# Patient Record
Sex: FEMALE | Race: WHITE | Employment: FULL TIME | URBAN - METROPOLITAN AREA
[De-identification: names, ages, dates, MRNs, and addresses within clinical notes are randomized per-mention and may not be internally consistent; named-entity substitution may affect disease eponyms.]

---

## 2023-05-05 ENCOUNTER — HOSPITAL ENCOUNTER (EMERGENCY)
Age: 48
Discharge: HOME | End: 2023-05-05
Attending: EMERGENCY MEDICINE
Payer: COMMERCIAL

## 2023-05-05 ENCOUNTER — HOSPITAL ENCOUNTER (OUTPATIENT)
Age: 48
Setting detail: SPECIMEN
Discharge: HOME | End: 2023-05-05
Payer: COMMERCIAL

## 2023-05-05 ENCOUNTER — HOSPITAL ENCOUNTER (OUTPATIENT)
Dept: CT IMAGING | Age: 48
End: 2023-05-05
Payer: COMMERCIAL

## 2023-05-05 VITALS
BODY MASS INDEX: 35.31 KG/M2 | DIASTOLIC BLOOD PRESSURE: 88 MMHG | OXYGEN SATURATION: 97 % | HEART RATE: 80 BPM | HEIGHT: 67 IN | TEMPERATURE: 98.4 F | WEIGHT: 225 LBS | SYSTOLIC BLOOD PRESSURE: 138 MMHG | RESPIRATION RATE: 18 BRPM

## 2023-05-05 DIAGNOSIS — R10.9 ABDOMINAL PAIN, UNSPECIFIED ABDOMINAL LOCATION: ICD-10-CM

## 2023-05-05 DIAGNOSIS — K52.9 COLITIS: Primary | ICD-10-CM

## 2023-05-05 LAB
ABSOLUTE EOS #: 0.18 K/UL (ref 0–0.44)
ABSOLUTE EOS #: 0.2 K/UL (ref 0–0.4)
ABSOLUTE IMMATURE GRANULOCYTE: 0.06 K/UL (ref 0–0.3)
ABSOLUTE LYMPH #: 1.77 K/UL (ref 1.1–3.7)
ABSOLUTE LYMPH #: 1.9 K/UL (ref 1–4.8)
ABSOLUTE MONO #: 0.55 K/UL (ref 0.1–1.2)
ABSOLUTE MONO #: 0.6 K/UL (ref 0.1–1.2)
ALBUMIN SERPL-MCNC: 4.1 G/DL (ref 3.5–5.2)
ALBUMIN/GLOBULIN RATIO: 1.5 (ref 1–2.5)
ALP SERPL-CCNC: 44 U/L (ref 35–104)
ALT SERPL-CCNC: 17 U/L (ref 5–33)
ANION GAP SERPL CALCULATED.3IONS-SCNC: 12 MMOL/L (ref 9–17)
AST SERPL-CCNC: 14 U/L
BASOPHILS # BLD: 1 % (ref 0–2)
BASOPHILS # BLD: 1 % (ref 0–2)
BASOPHILS ABSOLUTE: 0 K/UL (ref 0–0.2)
BASOPHILS ABSOLUTE: 0.06 K/UL (ref 0–0.2)
BILIRUB SERPL-MCNC: 0.8 MG/DL (ref 0.3–1.2)
BUN SERPL-MCNC: 15 MG/DL (ref 6–20)
CALCIUM SERPL-MCNC: 9.4 MG/DL (ref 8.6–10.4)
CHLORIDE SERPL-SCNC: 102 MMOL/L (ref 98–107)
CO2 SERPL-SCNC: 22 MMOL/L (ref 20–31)
CREAT SERPL-MCNC: 0.57 MG/DL (ref 0.5–0.9)
EOSINOPHILS RELATIVE PERCENT: 2 % (ref 1–4)
EOSINOPHILS RELATIVE PERCENT: 3 % (ref 1–4)
ERYTHROCYTE [SEDIMENTATION RATE] IN BLOOD BY WESTERGREN METHOD: 15 MM/HR (ref 0–20)
GFR SERPL CREATININE-BSD FRML MDRD: >60 ML/MIN/1.73M2
GLUCOSE SERPL-MCNC: 91 MG/DL (ref 70–99)
HCT VFR BLD AUTO: 42 % (ref 36–46)
HCT VFR BLD AUTO: 47.1 % (ref 36.3–47.1)
HGB BLD-MCNC: 14.1 G/DL (ref 12–16)
HGB BLD-MCNC: 15.3 G/DL (ref 11.9–15.1)
IMMATURE GRANULOCYTES: 1 %
LACTATE PLASV-SCNC: 1.4 MMOL/L (ref 0.5–2.2)
LIPASE SERPL-CCNC: 47 U/L (ref 13–60)
LYMPHOCYTES # BLD: 22 % (ref 24–43)
LYMPHOCYTES # BLD: 23 % (ref 24–44)
MCH RBC QN AUTO: 30.4 PG (ref 25.2–33.5)
MCH RBC QN AUTO: 30.5 PG (ref 26–34)
MCHC RBC AUTO-ENTMCNC: 32.5 G/DL (ref 28.4–34.8)
MCHC RBC AUTO-ENTMCNC: 33.5 G/DL (ref 31–37)
MCV RBC AUTO: 91.2 FL (ref 80–100)
MCV RBC AUTO: 93.5 FL (ref 82.6–102.9)
MONOCYTES # BLD: 7 % (ref 2–11)
MONOCYTES # BLD: 7 % (ref 3–12)
NRBC AUTOMATED: 0 PER 100 WBC
PDW BLD-RTO: 12.8 % (ref 11.8–14.4)
PDW BLD-RTO: 13.4 % (ref 12.5–15.4)
PLATELET # BLD AUTO: 226 K/UL (ref 140–450)
PLATELET # BLD AUTO: 257 K/UL (ref 138–453)
PMV BLD AUTO: 6.8 FL (ref 6–12)
PMV BLD AUTO: 9.5 FL (ref 8.1–13.5)
POTASSIUM SERPL-SCNC: 4.1 MMOL/L (ref 3.7–5.3)
PROT SERPL-MCNC: 6.8 G/DL (ref 6.4–8.3)
RBC # BLD: 4.61 M/UL (ref 4–5.2)
RBC # BLD: 5.04 M/UL (ref 3.95–5.11)
SEG NEUTROPHILS: 66 % (ref 36–66)
SEG NEUTROPHILS: 67 % (ref 36–65)
SEGMENTED NEUTROPHILS ABSOLUTE COUNT: 5.37 K/UL (ref 1.5–8.1)
SEGMENTED NEUTROPHILS ABSOLUTE COUNT: 5.5 K/UL (ref 1.8–7.7)
SODIUM SERPL-SCNC: 136 MMOL/L (ref 135–144)
WBC # BLD AUTO: 8 K/UL (ref 3.5–11.3)
WBC # BLD AUTO: 8.3 K/UL (ref 3.5–11)

## 2023-05-05 PROCEDURE — 85025 COMPLETE CBC W/AUTO DIFF WBC: CPT

## 2023-05-05 PROCEDURE — 83690 ASSAY OF LIPASE: CPT

## 2023-05-05 PROCEDURE — 83605 ASSAY OF LACTIC ACID: CPT

## 2023-05-05 PROCEDURE — 6360000004 HC RX CONTRAST MEDICATION: Performed by: NURSE PRACTITIONER

## 2023-05-05 PROCEDURE — 74177 CT ABD & PELVIS W/CONTRAST: CPT

## 2023-05-05 PROCEDURE — 36415 COLL VENOUS BLD VENIPUNCTURE: CPT

## 2023-05-05 PROCEDURE — 80053 COMPREHEN METABOLIC PANEL: CPT

## 2023-05-05 PROCEDURE — 99283 EMERGENCY DEPT VISIT LOW MDM: CPT

## 2023-05-05 PROCEDURE — 2580000003 HC RX 258: Performed by: NURSE PRACTITIONER

## 2023-05-05 PROCEDURE — 6370000000 HC RX 637 (ALT 250 FOR IP): Performed by: NURSE PRACTITIONER

## 2023-05-05 RX ORDER — CIPROFLOXACIN 500 MG/1
500 TABLET, FILM COATED ORAL 2 TIMES DAILY
Qty: 20 TABLET | Refills: 0 | Status: SHIPPED | OUTPATIENT
Start: 2023-05-05 | End: 2023-05-15

## 2023-05-05 RX ORDER — CIPROFLOXACIN 250 MG/1
500 TABLET, FILM COATED ORAL ONCE
Status: COMPLETED | OUTPATIENT
Start: 2023-05-05 | End: 2023-05-05

## 2023-05-05 RX ORDER — IXEKIZUMAB 80 MG/ML
INJECTION, SOLUTION SUBCUTANEOUS
COMMUNITY

## 2023-05-05 RX ORDER — METRONIDAZOLE 500 MG/1
500 TABLET ORAL 2 TIMES DAILY
Qty: 20 TABLET | Refills: 0 | Status: SHIPPED | OUTPATIENT
Start: 2023-05-05 | End: 2023-05-15

## 2023-05-05 RX ORDER — NITROFURANTOIN 25; 75 MG/1; MG/1
CAPSULE ORAL
COMMUNITY
Start: 2022-10-12

## 2023-05-05 RX ORDER — METRONIDAZOLE 500 MG/1
500 TABLET ORAL ONCE
Status: COMPLETED | OUTPATIENT
Start: 2023-05-05 | End: 2023-05-05

## 2023-05-05 RX ORDER — PROPRANOLOL HCL 60 MG
CAPSULE, EXTENDED RELEASE 24HR ORAL
COMMUNITY

## 2023-05-05 RX ORDER — SODIUM CHLORIDE 0.9 % (FLUSH) 0.9 %
10 SYRINGE (ML) INJECTION PRN
Status: DISCONTINUED | OUTPATIENT
Start: 2023-05-05 | End: 2023-05-08 | Stop reason: HOSPADM

## 2023-05-05 RX ORDER — 0.9 % SODIUM CHLORIDE 0.9 %
80 INTRAVENOUS SOLUTION INTRAVENOUS ONCE
Status: COMPLETED | OUTPATIENT
Start: 2023-05-05 | End: 2023-05-05

## 2023-05-05 RX ORDER — DULOXETIN HYDROCHLORIDE 60 MG/1
1 CAPSULE, DELAYED RELEASE ORAL
COMMUNITY
Start: 2019-05-29

## 2023-05-05 RX ADMIN — METRONIDAZOLE 500 MG: 500 TABLET ORAL at 20:07

## 2023-05-05 RX ADMIN — IOPAMIDOL 75 ML: 755 INJECTION, SOLUTION INTRAVENOUS at 13:17

## 2023-05-05 RX ADMIN — SODIUM CHLORIDE 80 ML: 9 INJECTION, SOLUTION INTRAVENOUS at 13:17

## 2023-05-05 RX ADMIN — SODIUM CHLORIDE, PRESERVATIVE FREE 10 ML: 5 INJECTION INTRAVENOUS at 13:17

## 2023-05-05 RX ADMIN — CIPROFLOXACIN 500 MG: 250 TABLET, FILM COATED ORAL at 20:07

## 2023-05-05 ASSESSMENT — PAIN - FUNCTIONAL ASSESSMENT: PAIN_FUNCTIONAL_ASSESSMENT: 0-10

## 2023-05-05 ASSESSMENT — PAIN SCALES - GENERAL: PAINLEVEL_OUTOF10: 6

## 2023-05-05 NOTE — DISCHARGE INSTRUCTIONS
worsening, changing or persistent symptoms please call your family doctor or return to the ER immediately. Tell us how we did during your visit at [email or web address]   and let us know about your experience

## 2023-05-05 NOTE — ED PROVIDER NOTES
[facility] Emergency Department  [id number] [address] [facility] [address] [facility] [address]  Phone: [phone]  Fax: [phone]      Attending Physician [address]       Chief Complaint   Patient presents with    Abdominal Pain     LUQ pain; had CT scan earlier today and appeared to be colitis; PCP advised to go to ER for further evaluation       DIAGNOSTIC RESULTS     LABS:  Labs Reviewed   CBC WITH AUTO DIFFERENTIAL   COMPREHENSIVE METABOLIC PANEL   LIPASE   LACTIC ACID       All other labs were within normal range or not returned as of this dictation. RADIOLOGY:  No orders to display         EMERGENCY DEPARTMENT COURSE:   Vitals:    Vitals:    05/05/23 1745   BP: 138/88   Pulse: 80   Resp: 18   Temp: 98.4 °F (36.9 °C)   TempSrc: Oral   SpO2: 97%   Weight: 102.1 kg (225 lb)   Height: 5' 7\" (1.702 m)     -------------------------  BP: 138/88, Temp: 98.4 °F (36.9 °C), Pulse: 80, Respirations: 18             PERTINENT ATTENDING PHYSICIAN COMMENTS:    I performed a history and physical examination of the patient and discussed management with the mid level provider. I reviewed the mid level provider's note and agree with the documented findings and plan of care. Any areas of disagreement are noted on the chart. I was personally present for the key portions of any procedures. I have documented in the chart those procedures where I was not present during the key portions. I have reviewed the emergency nurses triage note. I agree with the chief complaint, past medical history, past surgical history, allergies, medications, social and family history as documented unless otherwise noted below. Documentation of the HPI, Physical Exam and Medical Decision Making performed by mid level providers is based on my personal performance of the HPI, PE and MDM.  For Physician Assistant/ Nurse Practitioner cases/documentation I have personally evaluated this patient and have completed at least
in 8-12 hr if not improved for re-evaluation. We also discussed returning to the Emergency Department immediately if new or worsening symptoms occur. We have discussed the symptoms which are most concerning (e.g., bloody stool, fever, changing or worsening pain, persistent vomiting, chest pain shortness of breath, numbness or weakness to the arms or legs, coolness or color change to the arms or legs) that necessitate immediate return. The patient understands that at this time there is no evidence for a more malignant underlying process, but the patient also understands that early in the process of an illness or injury, an emergency department workup can be falsely reassuring. Routine discharge counseling was given, and the patient understands that worsening, changing or persistent symptoms should prompt an immediate call or follow up with their primary physician or return to the emergency department. The importance of appropriate follow up was also discussed. I have reviewed the disposition diagnosis with the patient and or their family/guardian. I have answered their questions and given discharge instructions. They voiced understanding of these instructions and did not have any further questions or complaints. REASSESSMENT          CRITICAL CARE TIME     CONSULTS:  None    PROCEDURES:  Unless otherwise noted below, none     Procedures        FINAL IMPRESSION      1. Colitis          DISPOSITION/PLAN   DISPOSITION Decision To Discharge 05/05/2023 07:56:34 PM      PATIENT REFERRED TO:  Jeremías Magana MD  [facility] [id number]  [address] [place] [address] Emergency Department  [address] [place] [address] [phone]    If symptoms worsen      DISCHARGE MEDICATIONS:  Discharge Medication List as of 5/5/2023  7:59 PM        START taking these medications    Details   ciprofloxacin (CIPRO) 500 MG tablet Take 1 tablet by mouth 2 times daily for 10

## 2023-05-06 LAB
25(OH)D3 SERPL-MCNC: 20 NG/ML
ALBUMIN SERPL-MCNC: 4.4 G/DL (ref 3.5–5.2)
ALBUMIN/GLOBULIN RATIO: 1.5 (ref 1–2.5)
ALP SERPL-CCNC: 47 U/L (ref 35–104)
ALT SERPL-CCNC: 19 U/L (ref 5–33)
ANION GAP SERPL CALCULATED.3IONS-SCNC: 12 MMOL/L (ref 9–17)
AST SERPL-CCNC: 15 U/L
BILIRUB SERPL-MCNC: 1 MG/DL (ref 0.3–1.2)
BUN SERPL-MCNC: 12 MG/DL (ref 6–20)
CALCIUM SERPL-MCNC: 9.4 MG/DL (ref 8.6–10.4)
CHLORIDE SERPL-SCNC: 101 MMOL/L (ref 98–107)
CO2 SERPL-SCNC: 24 MMOL/L (ref 20–31)
CREAT SERPL-MCNC: 0.56 MG/DL (ref 0.5–0.9)
CRP SERPL HS-MCNC: 5.2 MG/L (ref 0–5)
GFR SERPL CREATININE-BSD FRML MDRD: >60 ML/MIN/1.73M2
GLUCOSE SERPL-MCNC: 90 MG/DL (ref 70–99)
MAGNESIUM SERPL-MCNC: 2.2 MG/DL (ref 1.6–2.6)
POTASSIUM SERPL-SCNC: 4.5 MMOL/L (ref 3.7–5.3)
PROT SERPL-MCNC: 7.4 G/DL (ref 6.4–8.3)
SODIUM SERPL-SCNC: 137 MMOL/L (ref 135–144)
T4 FREE SERPL-MCNC: 1.2 NG/DL (ref 0.9–1.7)
TSH SERPL-ACNC: 0.98 UIU/ML (ref 0.3–5)

## 2023-05-06 ASSESSMENT — ENCOUNTER SYMPTOMS: ABDOMINAL PAIN: 1

## 2023-05-07 LAB
EST. AVERAGE GLUCOSE BLD GHB EST-MCNC: 108 MG/DL
HBA1C MFR BLD: 5.4 % (ref 4–6)

## 2023-05-10 ENCOUNTER — APPOINTMENT (OUTPATIENT)
Dept: CT IMAGING | Age: 48
End: 2023-05-10
Payer: COMMERCIAL

## 2023-05-10 ENCOUNTER — HOSPITAL ENCOUNTER (EMERGENCY)
Age: 48
Discharge: HOME | End: 2023-05-10
Attending: EMERGENCY MEDICINE
Payer: COMMERCIAL

## 2023-05-10 VITALS
HEIGHT: 67 IN | TEMPERATURE: 98.4 F | DIASTOLIC BLOOD PRESSURE: 88 MMHG | BODY MASS INDEX: 34.53 KG/M2 | RESPIRATION RATE: 16 BRPM | OXYGEN SATURATION: 99 % | SYSTOLIC BLOOD PRESSURE: 148 MMHG | HEART RATE: 71 BPM | WEIGHT: 220 LBS

## 2023-05-10 DIAGNOSIS — R10.32 LEFT LOWER QUADRANT ABDOMINAL PAIN: Primary | ICD-10-CM

## 2023-05-10 DIAGNOSIS — K63.89 EPIPLOIC APPENDAGITIS: ICD-10-CM

## 2023-05-10 LAB
ABSOLUTE EOS #: 0.2 K/UL (ref 0–0.4)
ABSOLUTE LYMPH #: 1.6 K/UL (ref 1–4.8)
ABSOLUTE MONO #: 0.5 K/UL (ref 0.1–1.2)
ANION GAP SERPL CALCULATED.3IONS-SCNC: 13 MMOL/L (ref 9–17)
BACTERIA: ABNORMAL
BASOPHILS # BLD: 1 % (ref 0–2)
BASOPHILS ABSOLUTE: 0.1 K/UL (ref 0–0.2)
BILIRUBIN URINE: NEGATIVE
BUN SERPL-MCNC: 11 MG/DL (ref 6–20)
CALCIUM SERPL-MCNC: 9.6 MG/DL (ref 8.6–10.4)
CHLORIDE SERPL-SCNC: 104 MMOL/L (ref 98–107)
CO2 SERPL-SCNC: 23 MMOL/L (ref 20–31)
COLOR: YELLOW
CREAT SERPL-MCNC: 0.68 MG/DL (ref 0.5–0.9)
EOSINOPHILS RELATIVE PERCENT: 2 % (ref 1–4)
EPITHELIAL CELLS UA: ABNORMAL /HPF (ref 0–5)
GFR SERPL CREATININE-BSD FRML MDRD: >60 ML/MIN/1.73M2
GLUCOSE SERPL-MCNC: 97 MG/DL (ref 70–99)
GLUCOSE UR STRIP.AUTO-MCNC: NEGATIVE MG/DL
HCT VFR BLD AUTO: 44.9 % (ref 36–46)
HGB BLD-MCNC: 15 G/DL (ref 12–16)
KETONES UR STRIP.AUTO-MCNC: NEGATIVE MG/DL
LACTATE PLASV-SCNC: 1.6 MMOL/L (ref 0.5–2.2)
LEUKOCYTE ESTERASE UR QL STRIP.AUTO: ABNORMAL
LYMPHOCYTES # BLD: 22 % (ref 24–44)
MCH RBC QN AUTO: 30 PG (ref 26–34)
MCHC RBC AUTO-ENTMCNC: 33.5 G/DL (ref 31–37)
MCV RBC AUTO: 89.7 FL (ref 80–100)
MONOCYTES # BLD: 7 % (ref 2–11)
NITRITE UR QL STRIP.AUTO: NEGATIVE
OTHER OBSERVATIONS UA: ABNORMAL
PDW BLD-RTO: 13.2 % (ref 12.5–15.4)
PLATELET # BLD AUTO: 295 K/UL (ref 140–450)
PMV BLD AUTO: 6.8 FL (ref 6–12)
POTASSIUM SERPL-SCNC: 3.9 MMOL/L (ref 3.7–5.3)
PROT UR STRIP.AUTO-MCNC: 5 MG/DL (ref 5–8)
PROT UR STRIP.AUTO-MCNC: NEGATIVE MG/DL
RBC # BLD: 5.01 M/UL (ref 4–5.2)
RBC CLUMPS #/AREA URNS AUTO: ABNORMAL /HPF (ref 0–2)
SEG NEUTROPHILS: 68 % (ref 36–66)
SEGMENTED NEUTROPHILS ABSOLUTE COUNT: 4.9 K/UL (ref 1.8–7.7)
SODIUM SERPL-SCNC: 140 MMOL/L (ref 135–144)
SPECIFIC GRAVITY UA: 1.01 (ref 1–1.03)
TURBIDITY: CLEAR
URINE HGB: NEGATIVE
UROBILINOGEN, URINE: NORMAL
WBC # BLD AUTO: 7.1 K/UL (ref 3.5–11)
WBC UA: ABNORMAL /HPF (ref 0–5)

## 2023-05-10 PROCEDURE — 80048 BASIC METABOLIC PNL TOTAL CA: CPT

## 2023-05-10 PROCEDURE — 99285 EMERGENCY DEPT VISIT HI MDM: CPT

## 2023-05-10 PROCEDURE — 96374 THER/PROPH/DIAG INJ IV PUSH: CPT

## 2023-05-10 PROCEDURE — 96372 THER/PROPH/DIAG INJ SC/IM: CPT

## 2023-05-10 PROCEDURE — 81001 URINALYSIS AUTO W/SCOPE: CPT

## 2023-05-10 PROCEDURE — 36415 COLL VENOUS BLD VENIPUNCTURE: CPT

## 2023-05-10 PROCEDURE — 83605 ASSAY OF LACTIC ACID: CPT

## 2023-05-10 PROCEDURE — 85025 COMPLETE CBC W/AUTO DIFF WBC: CPT

## 2023-05-10 PROCEDURE — 74177 CT ABD & PELVIS W/CONTRAST: CPT

## 2023-05-10 PROCEDURE — 2580000003 HC RX 258: Performed by: NURSE PRACTITIONER

## 2023-05-10 PROCEDURE — 6360000002 HC RX W HCPCS: Performed by: NURSE PRACTITIONER

## 2023-05-10 PROCEDURE — 6360000004 HC RX CONTRAST MEDICATION: Performed by: NURSE PRACTITIONER

## 2023-05-10 RX ORDER — 0.9 % SODIUM CHLORIDE 0.9 %
80 INTRAVENOUS SOLUTION INTRAVENOUS ONCE
Status: DISCONTINUED | OUTPATIENT
Start: 2023-05-10 | End: 2023-05-10 | Stop reason: HOSPADM

## 2023-05-10 RX ORDER — ONDANSETRON 2 MG/ML
4 INJECTION INTRAMUSCULAR; INTRAVENOUS ONCE
Status: COMPLETED | OUTPATIENT
Start: 2023-05-10 | End: 2023-05-10

## 2023-05-10 RX ORDER — 0.9 % SODIUM CHLORIDE 0.9 %
1000 INTRAVENOUS SOLUTION INTRAVENOUS ONCE
Status: COMPLETED | OUTPATIENT
Start: 2023-05-10 | End: 2023-05-10

## 2023-05-10 RX ORDER — DICYCLOMINE HYDROCHLORIDE 10 MG/ML
20 INJECTION INTRAMUSCULAR ONCE
Status: COMPLETED | OUTPATIENT
Start: 2023-05-10 | End: 2023-05-10

## 2023-05-10 RX ORDER — SODIUM CHLORIDE 0.9 % (FLUSH) 0.9 %
10 SYRINGE (ML) INJECTION PRN
Status: DISCONTINUED | OUTPATIENT
Start: 2023-05-10 | End: 2023-05-10 | Stop reason: HOSPADM

## 2023-05-10 RX ORDER — HYDROCODONE BITARTRATE AND ACETAMINOPHEN 5; 325 MG/1; MG/1
1 TABLET ORAL EVERY 6 HOURS PRN
Qty: 12 TABLET | Refills: 0 | Status: SHIPPED | OUTPATIENT
Start: 2023-05-10 | End: 2023-05-13

## 2023-05-10 RX ORDER — DICYCLOMINE HCL 20 MG
20 TABLET ORAL EVERY 6 HOURS
Qty: 28 TABLET | Refills: 0 | Status: SHIPPED | OUTPATIENT
Start: 2023-05-10 | End: 2023-05-17

## 2023-05-10 RX ADMIN — Medication 80 ML: at 16:15

## 2023-05-10 RX ADMIN — IOPAMIDOL 75 ML: 755 INJECTION, SOLUTION INTRAVENOUS at 16:15

## 2023-05-10 RX ADMIN — DICYCLOMINE HYDROCHLORIDE 20 MG: 10 INJECTION, SOLUTION INTRAMUSCULAR at 15:51

## 2023-05-10 RX ADMIN — ONDANSETRON 4 MG: 2 INJECTION INTRAMUSCULAR; INTRAVENOUS at 15:48

## 2023-05-10 RX ADMIN — SODIUM CHLORIDE, PRESERVATIVE FREE 10 ML: 5 INJECTION INTRAVENOUS at 16:15

## 2023-05-10 RX ADMIN — SODIUM CHLORIDE 1000 ML: 9 INJECTION, SOLUTION INTRAVENOUS at 15:49

## 2023-05-10 ASSESSMENT — ENCOUNTER SYMPTOMS
ABDOMINAL PAIN: 1
NAUSEA: 1
VOMITING: 1

## 2023-05-10 ASSESSMENT — PAIN - FUNCTIONAL ASSESSMENT: PAIN_FUNCTIONAL_ASSESSMENT: 0-10

## 2023-05-10 ASSESSMENT — PAIN SCALES - GENERAL
PAINLEVEL_OUTOF10: 6
PAINLEVEL_OUTOF10: 2

## 2023-05-10 ASSESSMENT — PAIN DESCRIPTION - ORIENTATION: ORIENTATION: LEFT;LOWER

## 2023-05-10 ASSESSMENT — PAIN DESCRIPTION - LOCATION: LOCATION: ABDOMEN

## 2023-05-10 NOTE — ED PROVIDER NOTES
[address]      Pt Name: Sonya Mo  MRN: [id number]  [place] 1975  Date of evaluation: 5/10/2023  Provider: JONY Mahmood CNP    CHIEF COMPLAINT       Chief Complaint   Patient presents with    Abdominal Pain     LLQ was seen on Friday and placed on 2 antibiotics and pain is worsening and having worsening diarrhea, her doc sent her in          HISTORY OF PRESENT ILLNESS   (Location/Symptom, Timing/Onset, Context/Setting, Quality, Duration, Modifying Factors, Severity)  Note limiting factors. Sonya Mo is a 49 y.o. female who presents to the emergency department for evaluation of left lower quadrant abdominal pain duration of approximately 10 days. Patient was seen and evaluated here, diagnosed with infectious colitis and placed on Cipro and Flagyl. Symptoms are getting worse. Patient states that diarrhea is worsening, pain is worsening. She reports subjective fevers. She has not been able to eat anything today        Nursing Notes were reviewed. REVIEW OF SYSTEMS    (2-9 systems for level 4, 10 or more for level 5)     Review of Systems   Gastrointestinal:  Positive for abdominal pain, nausea and vomiting. All other systems reviewed and are negative. Except as noted above the remainder of the review of systems was reviewed and negative.        PAST MEDICAL HISTORY     Past Medical History:   Diagnosis Date    Anxiety     IBS (irritable bowel syndrome)     Psoriatic arthritis ([facility] [address]          SURGICAL HISTORY       Past Surgical History:   Procedure Laterality Date    APPENDECTOMY       SECTION      HYSTERECTOMY (CERVIX STATUS UNKNOWN)      LYMPHADENECTOMY           CURRENT MEDICATIONS       Discharge Medication List as of 5/10/2023  6:30 PM        CONTINUE these medications which have NOT CHANGED    Details   Cholecalciferol 50 MCG ( UT) TABS Take by mouthHistorical Med      cyanocobalamin
ABDOMEN PELVIS W IV CONTRAST Additional Contrast? None   Preliminary Result   Fat stranding adjacent to the distal aspect of the transverse colon again   noted. The amount of fat stranding has increased, but there is now an   apparent fatty lesion central within that, and the fat stranding is no longer   circumferential.  There is no focal mural thickening of the large bowel. The   findings are most compatible with epiploic appendagitis. Not indicated unless otherwise documented above    EMERGENCY DEPARTMENT COURSE:     The patient was given the following medications:  Orders Placed This Encounter   Medications    0.9 % sodium chloride bolus    dicyclomine (BENTYL) injection 20 mg    ondansetron (ZOFRAN) injection 4 mg    0.9 % sodium chloride bolus    sodium chloride flush 0.9 % injection 10 mL    iopamidol (ISOVUE-370) 76 % injection 75 mL    dicyclomine (BENTYL) 20 MG tablet     Sig: Take 1 tablet by mouth in the morning and 1 tablet at noon and 1 tablet in the evening and 1 tablet before bedtime. Do all this for 7 days. Dispense:  28 tablet     Refill:  0        Vitals:   -------------------------  BP (!) 133/95   Pulse 67   Temp 98.4 °F (36.9 °C) (Oral)   Resp 16   Ht 5' 7\" (1.702 m)   Wt 99.8 kg (220 lb)   SpO2 98%   BMI 34.46 kg/m²     6:50 PM normal CBC normal electrolytes and kidney function. CT shows some fat stranding consistent more with epiploic appendagitis. Case was discussed with Autumn Aguero and Billy Salinas. Patient is ultimately feeling better and would like to go home. Discharged to follow-up and return if worsening symptoms or any other concerns. Prescription for Bentyl.     CRITICAL CARE:    None    PROCEDURES:    None      OARRS Report if indicated           (Please note that portions of this note were completed with a voice recognition program.  Efforts were made to edit the dictations but occasionally words are mis-transcribed.)    Tj Pearson DO   Attending

## 2023-05-10 NOTE — CONSULTS
Chief Complaint   Patient presents with    Abdominal Pain     LLQ was seen on Friday and placed on 2 antibiotics and pain is worsening and having worsening diarrhea, her doc sent her in        [facility]:  48 y/o female presents for evaluation for left upper quadrant/epigastric pain. Patient was seen and evaluated a week ago for same symptoms. Diagnosed with CT findings of epiploic appendagitis of transverse colon. Patient returned today because patient had transient flare up of worsening severity of pain. Patient with nausea but no vomiting. Patient is passing flatus. Vitals:    05/10/23 1730   BP:    Pulse: 67   Resp: 16   Temp:    SpO2: 98%       There is no problem list on file for this patient. Current Facility-Administered Medications   Medication Dose Route Frequency Provider Last Rate Last Admin    0.9 % sodium chloride bolus  80 mL IntraVENous Once Cobb Linear, APRN - CNP        sodium chloride flush 0.9 % injection 10 mL  10 mL IntraVENous PRN Rakesh Linear, APRN - CNP   10 mL at 05/10/23 1615     Current Outpatient Medications   Medication Sig Dispense Refill    dicyclomine (BENTYL) 20 MG tablet Take 1 tablet by mouth in the morning and 1 tablet at noon and 1 tablet in the evening and 1 tablet before bedtime. Do all this for 7 days.  28 tablet 0    Cholecalciferol 50 MCG (2000 UT) TABS Take by mouth      cyanocobalamin 100 MCG tablet Vitamin B12 100 MCG Oral Tablet   Refills: 0   Started Active      DULoxetine (CYMBALTA) 60 MG extended release capsule 1 capsule      ixekizumab (TALTZ) 80 MG/ML SOAJ prefilled syringe as directed Subcutaneous      nitrofurantoin, macrocrystal-monohydrate, (MACROBID) 100 MG capsule 1 cap Orally BID for 5 days      propranolol (INDERAL LA) 60 MG extended release capsule take 1 capsule by mouth once daily Orally Once a day for 90 days      ciprofloxacin (CIPRO) 500 MG tablet Take 1 tablet by mouth 2 times daily for 10 days 20 tablet 0

## 2023-05-10 NOTE — DISCHARGE INSTRUCTIONS
Home. Stop antibiotics. Use tylenol, motrin, bentyl as needed to help with pain. Norco if needed for any severe pain. Follow up with surgery. Call in the morning to schedule an appointment.

## 2023-10-09 ENCOUNTER — HOSPITAL ENCOUNTER (EMERGENCY)
Age: 48
Discharge: LEFT BEFORE DISCHARGE | End: 2023-10-09

## 2023-10-09 NOTE — ED NOTES
Pt approached registration- stated she was going to go elsewhere for treatment per Edgardo Rosales in registration.  Pt actual LWBS 1550     Bereket Jorgensen RN  10/09/23 1601       Bereket Jorgensen, [address] [place] [address]  10/09/23 1730

## 2025-03-14 ENCOUNTER — TELEPHONE (OUTPATIENT)
Age: 50
End: 2025-03-14

## 2025-03-14 DIAGNOSIS — M25.562 LEFT KNEE PAIN, UNSPECIFIED CHRONICITY: Primary | ICD-10-CM

## 2025-03-14 NOTE — TELEPHONE ENCOUNTER
Phoned patient in regards to getting x-ray taken prior to appointment with provider. Instructed to get x-ray within an hour prior to appointment at: [address] -- The sign by the road states \"Lab and imaging services\". Order for x-ray has already been placed in system.

## 2025-03-31 ENCOUNTER — TELEPHONE (OUTPATIENT)
Age: 50
End: 2025-03-31

## 2025-03-31 NOTE — TELEPHONE ENCOUNTER
Phoned patient in regards to getting x-ray taken prior to appointment with provider. Instructed to get x-ray within an hour prior to appointment at: [address] -- The sign by the road states \"Lab and imaging services\". Order for x-ray has already been placed in system.

## 2025-04-01 ENCOUNTER — OFFICE VISIT (OUTPATIENT)
Age: 50
End: 2025-04-01

## 2025-04-01 ENCOUNTER — HOSPITAL ENCOUNTER (OUTPATIENT)
Age: 50
Discharge: HOME | End: 2025-04-03
Payer: COMMERCIAL

## 2025-04-01 VITALS — BODY MASS INDEX: 34.53 KG/M2 | HEIGHT: 67 IN | WEIGHT: 220 LBS

## 2025-04-01 DIAGNOSIS — M25.562 LEFT KNEE PAIN, UNSPECIFIED CHRONICITY: Primary | ICD-10-CM

## 2025-04-01 PROCEDURE — 73562 X-RAY EXAM OF KNEE 3: CPT

## 2025-04-01 RX ADMIN — LIDOCAINE HYDROCHLORIDE 2 ML: 10 INJECTION, SOLUTION INFILTRATION; PERINEURAL at 08:57

## 2025-04-01 RX ADMIN — METHYLPREDNISOLONE ACETATE 80 MG: 80 INJECTION, SUSPENSION INTRA-ARTICULAR; INTRALESIONAL; INTRAMUSCULAR; SOFT TISSUE at 08:57

## 2025-04-02 NOTE — PROGRESS NOTES
[facility]      [facility] PHYSICIANS [facility] ORTHOPAEDICS AND SPORTS MEDICINE  [address] [place], [place] [address]  [place] [address]  Dept: [phone]  Dept Fax: [phone]    Chief Compliant:  Chief Complaint   Patient presents with    Knee Pain     L Knee Pain        History of Present Illness:  This is a 49 y.o. female who presents to the clinic today for evaluation / follow up of left knee pain.  She states is really been bothering her for years however in the last several months its gotten a little bit worse.  She has had 2 previous knee scopes on this knee.  She is here today for further evaluation with dull aching pain in the knee worse with activity.  She states if she is on her feet for any significant length of time throughout the day it does seem to swell on her..       Physical Exam:    On exam today there is no effusion there is some tenderness over the joint line there is some minor crepitus no varus valgus instability she has good knee extension and flexion with good quadricep strength.  Skin is normal over top.    Nursing note and vitals reviewed.     Labs and Imaging: X-ray of bilateral knees reviewed.  He we can see the left knee has some mild medial joint space narrowing.  Otherwise overall no acute process.    XR taken today:  No results found.        Orders Placed This Encounter   Procedures    MRI KNEE LEFT WO CONTRAST     Standing Status:   Future     Expected Date:   4/1/2025     Expiration Date:   10/1/2025     Reason for exam::   eval for meniscal tear     What is the sedation requirement?:   None       Assessment and Plan:  1. Left knee pain, unspecified chronicity          This is a 49 y.o. female with left knee pain likely arthritis.  I discussed with her that I would like to get an MRI of the left knee for further evaluation for meniscal tear versus the extent of arthritis.  At that point we can make some decisions for her

## 2025-04-03 RX ORDER — LIDOCAINE HYDROCHLORIDE 10 MG/ML
2 INJECTION, SOLUTION INFILTRATION; PERINEURAL ONCE
Status: COMPLETED | OUTPATIENT
Start: 2025-04-03 | End: 2025-04-01

## 2025-04-03 RX ORDER — METHYLPREDNISOLONE ACETATE 80 MG/ML
80 INJECTION, SUSPENSION INTRA-ARTICULAR; INTRALESIONAL; INTRAMUSCULAR; SOFT TISSUE ONCE
Status: COMPLETED | OUTPATIENT
Start: 2025-04-03 | End: 2025-04-01